# Patient Record
Sex: FEMALE | Race: WHITE | URBAN - METROPOLITAN AREA
[De-identification: names, ages, dates, MRNs, and addresses within clinical notes are randomized per-mention and may not be internally consistent; named-entity substitution may affect disease eponyms.]

---

## 2022-10-17 ENCOUNTER — INITIAL CONSULT (OUTPATIENT)
Dept: ONCOLOGY | Age: 47
End: 2022-10-17
Payer: COMMERCIAL

## 2022-10-17 ENCOUNTER — HOSPITAL ENCOUNTER (OUTPATIENT)
Dept: INFUSION THERAPY | Age: 47
Discharge: HOME | End: 2022-10-17
Payer: COMMERCIAL

## 2022-10-17 VITALS
BODY MASS INDEX: 45.99 KG/M2 | HEIGHT: 67 IN | TEMPERATURE: 97.9 F | OXYGEN SATURATION: 99 % | HEART RATE: 73 BPM | SYSTOLIC BLOOD PRESSURE: 165 MMHG | DIASTOLIC BLOOD PRESSURE: 74 MMHG | WEIGHT: 293 LBS

## 2022-10-17 DIAGNOSIS — D64.9 ANEMIA, UNSPECIFIED TYPE: ICD-10-CM

## 2022-10-17 DIAGNOSIS — D72.819 LEUKOPENIA, UNSPECIFIED TYPE: Primary | ICD-10-CM

## 2022-10-17 DIAGNOSIS — D72.819 LEUKOPENIA, UNSPECIFIED TYPE: ICD-10-CM

## 2022-10-17 LAB
ALBUMIN SERPL-MCNC: 4.2 GM/DL (ref 3.4–5)
ALP BLD-CCNC: 61 IU/L (ref 40–128)
ALT SERPL-CCNC: 8 U/L (ref 10–40)
ANION GAP SERPL CALCULATED.3IONS-SCNC: 11 MMOL/L (ref 4–16)
AST SERPL-CCNC: 11 IU/L (ref 15–37)
BASOPHILS ABSOLUTE: 0 K/CU MM
BASOPHILS RELATIVE PERCENT: 0.5 % (ref 0–1)
BILIRUB SERPL-MCNC: 0.3 MG/DL (ref 0–1)
BUN BLDV-MCNC: 13 MG/DL (ref 6–23)
CALCIUM SERPL-MCNC: 9 MG/DL (ref 8.3–10.6)
CHLORIDE BLD-SCNC: 103 MMOL/L (ref 99–110)
CO2: 23 MMOL/L (ref 21–32)
CREAT SERPL-MCNC: 0.6 MG/DL (ref 0.6–1.1)
DIFFERENTIAL TYPE: ABNORMAL
EOSINOPHILS ABSOLUTE: 0.1 K/CU MM
EOSINOPHILS RELATIVE PERCENT: 1.5 % (ref 0–3)
FERRITIN: 24 NG/ML (ref 15–150)
FOLATE: 7.6 NG/ML (ref 3.1–17.5)
GFR SERPL CREATININE-BSD FRML MDRD: >60 ML/MIN/1.73M2
GLUCOSE BLD-MCNC: 107 MG/DL (ref 70–99)
HCT VFR BLD CALC: 41.1 % (ref 37–47)
HEMOGLOBIN: 12.6 GM/DL (ref 12.5–16)
HEPATITIS C ANTIBODY: NON REACTIVE
IRON: 56 UG/DL (ref 37–145)
LYMPHOCYTES ABSOLUTE: 1.1 K/CU MM
LYMPHOCYTES RELATIVE PERCENT: 27.9 % (ref 24–44)
MCH RBC QN AUTO: 28.1 PG (ref 27–31)
MCHC RBC AUTO-ENTMCNC: 30.7 % (ref 32–36)
MCV RBC AUTO: 91.5 FL (ref 78–100)
MONOCYTES ABSOLUTE: 0.5 K/CU MM
MONOCYTES RELATIVE PERCENT: 11.3 % (ref 0–4)
PCT TRANSFERRIN: 15 % (ref 10–44)
PDW BLD-RTO: 14.7 % (ref 11.7–14.9)
PLATELET # BLD: 227 K/CU MM (ref 140–440)
PMV BLD AUTO: 11.7 FL (ref 7.5–11.1)
POTASSIUM SERPL-SCNC: 4.4 MMOL/L (ref 3.5–5.1)
RBC # BLD: 4.49 M/CU MM (ref 4.2–5.4)
RETICULOCYTE COUNT PCT: 1.9 % (ref 0.2–2.2)
SEGMENTED NEUTROPHILS ABSOLUTE COUNT: 2.4 K/CU MM
SEGMENTED NEUTROPHILS RELATIVE PERCENT: 58.8 % (ref 36–66)
SODIUM BLD-SCNC: 137 MMOL/L (ref 135–145)
TOTAL IRON BINDING CAPACITY: 365 UG/DL (ref 250–450)
TOTAL PROTEIN: 6.7 GM/DL (ref 6.4–8.2)
TSH HIGH SENSITIVITY: 2.14 UIU/ML (ref 0.27–4.2)
UNSATURATED IRON BINDING CAPACITY: 309 UG/DL (ref 110–370)
VITAMIN B-12: 307 PG/ML (ref 211–911)
WBC # BLD: 4.1 K/CU MM (ref 4–10.5)

## 2022-10-17 PROCEDURE — 99204 OFFICE O/P NEW MOD 45 MIN: CPT | Performed by: INTERNAL MEDICINE

## 2022-10-17 PROCEDURE — 82746 ASSAY OF FOLIC ACID SERUM: CPT

## 2022-10-17 PROCEDURE — 82607 VITAMIN B-12: CPT

## 2022-10-17 PROCEDURE — 84443 ASSAY THYROID STIM HORMONE: CPT

## 2022-10-17 PROCEDURE — 86707 HEPATITIS BE ANTIBODY: CPT

## 2022-10-17 PROCEDURE — 85045 AUTOMATED RETICULOCYTE COUNT: CPT

## 2022-10-17 PROCEDURE — 99211 OFF/OP EST MAY X REQ PHY/QHP: CPT

## 2022-10-17 PROCEDURE — 83550 IRON BINDING TEST: CPT

## 2022-10-17 PROCEDURE — 83540 ASSAY OF IRON: CPT

## 2022-10-17 PROCEDURE — 36415 COLL VENOUS BLD VENIPUNCTURE: CPT

## 2022-10-17 PROCEDURE — 85025 COMPLETE CBC W/AUTO DIFF WBC: CPT

## 2022-10-17 PROCEDURE — 86038 ANTINUCLEAR ANTIBODIES: CPT

## 2022-10-17 PROCEDURE — 86803 HEPATITIS C AB TEST: CPT

## 2022-10-17 PROCEDURE — 84165 PROTEIN E-PHORESIS SERUM: CPT

## 2022-10-17 PROCEDURE — 82728 ASSAY OF FERRITIN: CPT

## 2022-10-17 PROCEDURE — 87389 HIV-1 AG W/HIV-1&-2 AB AG IA: CPT

## 2022-10-17 PROCEDURE — 80053 COMPREHEN METABOLIC PANEL: CPT

## 2022-10-17 PROCEDURE — 84436 ASSAY OF TOTAL THYROXINE: CPT

## 2022-10-17 PROCEDURE — 84155 ASSAY OF PROTEIN SERUM: CPT

## 2022-10-17 RX ORDER — ERGOCALCIFEROL (VITAMIN D2) 50 MCG
CAPSULE ORAL
COMMUNITY

## 2022-10-17 RX ORDER — HYDROXYCHLOROQUINE SULFATE 200 MG/1
TABLET, FILM COATED ORAL
COMMUNITY
Start: 2022-09-14

## 2022-10-17 RX ORDER — LEVOTHYROXINE SODIUM 50 MCG
TABLET ORAL
COMMUNITY
Start: 2022-08-12

## 2022-10-17 ASSESSMENT — PATIENT HEALTH QUESTIONNAIRE - PHQ9
SUM OF ALL RESPONSES TO PHQ QUESTIONS 1-9: 0
SUM OF ALL RESPONSES TO PHQ QUESTIONS 1-9: 0
SUM OF ALL RESPONSES TO PHQ9 QUESTIONS 1 & 2: 0
SUM OF ALL RESPONSES TO PHQ QUESTIONS 1-9: 0
2. FEELING DOWN, DEPRESSED OR HOPELESS: 0
SUM OF ALL RESPONSES TO PHQ QUESTIONS 1-9: 0
1. LITTLE INTEREST OR PLEASURE IN DOING THINGS: 0

## 2022-10-17 NOTE — PROGRESS NOTES
hydroxychloroquine (PLAQUENIL) 200 MG tablet       SYNTHROID 50 MCG tablet TAKE 1 TABLET BY MOUTH EVERY DAY      metoprolol tartrate (LOPRESSOR) 25 MG tablet TAKE 1/2 TABLET BY MOUTH TWICE A DAY      Vitamin D, Ergocalciferol, 50 MCG (2000 UT) CAPS Take by mouth       No current facility-administered medications on file prior to visit. Review of Systems:    Constitutional:  No weight loss, No fever, No chills, No night sweats. Energy level fair  Eyes:  No diplopia, No transient or permanent loss of vision, No scotomata. ENT / Mouth:  No epistaxis, No dysphagia, No hoarseness, No oral ulcers, No gingival bleeding. No sore throat, No postnasal drip, No nasal drip, No mouth pain, No sinus pain, No tinnitus, Normal hearing. Cardiovascular:  No chest pain, No palpitations, No syncope, No upper extremity edema, No lower extremity edema, No calf discomfort. Respiratory:  No cough. No hemoptysis, No pleurisy, No wheezing, No dyspnea. Gastrointestinal:  No abdominal pain, No abdominal cramping, No nausea, No vomiting, No constipation, No diarrhea, No hematochezia, No melena, No jaundice, No dyspepsia, No dysphagia. Urinary:  No dysuria, No hematuria, No urinary incontinence.      Vital Signs: BP (!) 165/74 (Site: Right Lower Arm, Position: Sitting, Cuff Size: Medium Adult)   Pulse 73   Temp 97.9 °F (36.6 °C) (Temporal)   Ht 5' 7\" (1.702 m)   Wt (!) 314 lb (142.4 kg)   SpO2 99%   BMI 49.18 kg/m²      CONSTITUTIONAL: awake, alert, obese, anxious   EYES: JANET, No pallor or any icterus  ENT: ATNC  NECK: No JVD  HEMATOLOGIC/LYMPHATIC: no cervical, supraclavicular or axillary lymphadenopathy   LUNGS: CTAB  CARDIOVASCULAR: distant heart sounds   ABDOMEN: soft ntnd bs pos  MUSCULOSKELETAL: full range of motion noted, tone is normal  NEUROLOGIC: GI  SKIN: No rash  EXTREMITIES: Mild LE edema bilaterally     Labs:  Hematology:  No results found for: WBC, RBC, HGB, HCT, MCV, MCH, MCHC, RDW, PLT, MPV, BANDSPCT, SEGSPCT, EOSRELPCT, BASOPCT, LYMPHOPCT, MONOPCT, BANDABS, SEGSABS, EOSABS, BASOSABS, LYMPHSABS, MONOSABS, DIFFTYPE, ANISOCYTOSIS, POLYCHROM, WBCMORP, PLTM  No results found for: ESR  Chemistry:  No results found for: NA, K, CL, CO2, BUN, CREATININE, GLUCOSE, CALCIUM, PROT, LABALBU, BILITOT, ALKPHOS, AST, ALT, LABGLOM, GFRAA, AGRATIO, GLOB, PHOS, MG, POCCA, POCGLU  No results found for: MMA, LDH, HOMOCYSTEINE  No components found for: LD  No results found for: TSHHS, T4FREE, FT3  Immunology:  No results found for: PROT, SPEP, ALBUMINELP, LABALPH, LABBETA, GAMGLOB  No results found for: Oleta Greyson, KLFLCR  No results found for: B2M  Coagulation Panel:  No results found for: PROTIME, INR, APTT, DDIMER  Anemia Panel:  No results found for: MQSGTLTI96, FOLATE  Tumor Markers:  No results found for: , CEA, , LABCA2, PSA     Observations:  ECOG:  PHQ-9 Total Score: 0 (10/17/2022 10:38 AM)       Assessment & Plan:                                                          Leukopenia, could be secondary to antibiotic use in August 2022. But will repeat CBC and if low again will rule out nutritional deficiency, thyroid dysfunction, hepatitis, HIV, connective tissue disorder. But could be secondary to rheumatoid arthritis and/or hydroxychloroquine as well as well. Is followed by rhumatology    Anemia, normocytic. No with history of for menorrhagia. Will rule out iron deficiency. Hypertension, looks like she is on metoprolol and is compliant with that. Recommend that she maintains a log and discuss with primary care physician if remains elevated. Recommend low-salt diet and weight loss. Exercise as tolerated. Continue other medical care. Thank you for letting us be part of the care and will follow along. Discussed above findings and plan with her and she voiced understanding. Answered all her questions. Discussed healthy lifestyle including healthy diet, regular exercise as tolerated. Also discussed importance of being up-to-date with age-appropriate screening tools. Looks like she is up-to-date with mammogram.  Colonoscopy 2022 with benign polyps. Is up-to-date with Pap smears as well    Recommend follow-up with primary care physician and other specialists. Please do not hesitate to contact us if you need further information. Return to clinic 3 months or earlier if new Sx    I have recommended that the patient follow CDC guidelines for prevention of COVID-19 infection.     [id number]al

## 2022-10-17 NOTE — PROGRESS NOTES
MA Rooming Questions  Patient: Earl Lord  MRN: [id number]    Date: 10/17/2022        NEW PATIENT   . Pt was made aware that Dr. Lucila Vega has 1 patient(s) to see before them and no questions or concerns at this time. 5. Did the patient have a depression screening completed today?  Yes    PHQ-9 Total Score: 0 (10/17/2022 10:38 AM)       PHQ-9 Given to (if applicable):               PHQ-9 Score (if applicable):                     [] Positive     []  Negative              Does question #9 need addressed (if applicable)                     [] Yes    []  No               Moshe Burgos CMA

## 2022-10-18 LAB
HEPATITIS BE ANTIBODY: NEGATIVE
T4 TOTAL: 8.5 UG/DL (ref 4.5–11.7)

## 2022-10-19 LAB
ANTI-NUCLEAR ANTIBODY (ANA): NORMAL
HIV SCREEN: NON REACTIVE

## 2022-10-21 LAB
ALBUMIN ELP: 3.6 GM/DL (ref 3.2–5.6)
ALPHA-1-GLOBULIN: 0.2 GM/DL (ref 0.1–0.4)
ALPHA-2-GLOBULIN: 0.6 GM/DL (ref 0.4–1.2)
BETA GLOBULIN: 1 GM/DL (ref 0.5–1.3)
GAMMA GLOBULIN: 1.3 GM/DL (ref 0.5–1.6)
SPEP INTERPRETATION: NORMAL
TOTAL PROTEIN: 6.7 GM/DL (ref 6.4–8.2)

## 2023-01-20 ENCOUNTER — HOSPITAL ENCOUNTER (OUTPATIENT)
Dept: INFUSION THERAPY | Age: 48
Discharge: HOME | End: 2023-01-20
Payer: COMMERCIAL

## 2023-01-20 DIAGNOSIS — D64.9 ANEMIA, UNSPECIFIED TYPE: ICD-10-CM

## 2023-01-20 DIAGNOSIS — D72.819 LEUKOPENIA, UNSPECIFIED TYPE: ICD-10-CM

## 2023-01-20 LAB
ALBUMIN SERPL-MCNC: 4.2 GM/DL (ref 3.4–5)
ALP BLD-CCNC: 63 IU/L (ref 40–129)
ALT SERPL-CCNC: 9 U/L (ref 10–40)
ANION GAP SERPL CALCULATED.3IONS-SCNC: 8 MMOL/L (ref 4–16)
AST SERPL-CCNC: 13 IU/L (ref 15–37)
BASOPHILS ABSOLUTE: 0 K/CU MM
BASOPHILS RELATIVE PERCENT: 0.7 % (ref 0–1)
BILIRUB SERPL-MCNC: 0.3 MG/DL (ref 0–1)
BUN BLDV-MCNC: 17 MG/DL (ref 6–23)
CALCIUM SERPL-MCNC: 8.9 MG/DL (ref 8.3–10.6)
CHLORIDE BLD-SCNC: 105 MMOL/L (ref 99–110)
CO2: 29 MMOL/L (ref 21–32)
CREAT SERPL-MCNC: 0.6 MG/DL (ref 0.6–1.1)
DIFFERENTIAL TYPE: ABNORMAL
EOSINOPHILS ABSOLUTE: 0.1 K/CU MM
EOSINOPHILS RELATIVE PERCENT: 2.9 % (ref 0–3)
FERRITIN: 24 NG/ML (ref 15–150)
GFR SERPL CREATININE-BSD FRML MDRD: >60 ML/MIN/1.73M2
GLUCOSE BLD-MCNC: 109 MG/DL (ref 70–99)
HCT VFR BLD CALC: 39.9 % (ref 37–47)
HEMOGLOBIN: 12.3 GM/DL (ref 12.5–16)
IRON: 45 UG/DL (ref 37–145)
LACTATE DEHYDROGENASE: 163 IU/L (ref 120–246)
LYMPHOCYTES ABSOLUTE: 1.4 K/CU MM
LYMPHOCYTES RELATIVE PERCENT: 30.1 % (ref 24–44)
MCH RBC QN AUTO: 27.8 PG (ref 27–31)
MCHC RBC AUTO-ENTMCNC: 30.8 % (ref 32–36)
MCV RBC AUTO: 90.3 FL (ref 78–100)
MONOCYTES ABSOLUTE: 0.5 K/CU MM
MONOCYTES RELATIVE PERCENT: 10.8 % (ref 0–4)
PCT TRANSFERRIN: 12 % (ref 10–44)
PDW BLD-RTO: 14.9 % (ref 11.7–14.9)
PLATELET # BLD: 219 K/CU MM (ref 140–440)
PMV BLD AUTO: 11 FL (ref 7.5–11.1)
POTASSIUM SERPL-SCNC: 4.5 MMOL/L (ref 3.5–5.1)
RBC # BLD: 4.42 M/CU MM (ref 4.2–5.4)
SEGMENTED NEUTROPHILS ABSOLUTE COUNT: 2.5 K/CU MM
SEGMENTED NEUTROPHILS RELATIVE PERCENT: 55.5 % (ref 36–66)
SODIUM BLD-SCNC: 142 MMOL/L (ref 135–145)
TOTAL IRON BINDING CAPACITY: 364 UG/DL (ref 250–450)
TOTAL PROTEIN: 7 GM/DL (ref 6.4–8.2)
UNSATURATED IRON BINDING CAPACITY: 319 UG/DL (ref 110–370)
WBC # BLD: 4.6 K/CU MM (ref 4–10.5)

## 2023-01-20 PROCEDURE — 82728 ASSAY OF FERRITIN: CPT

## 2023-01-20 PROCEDURE — 85025 COMPLETE CBC W/AUTO DIFF WBC: CPT

## 2023-01-20 PROCEDURE — 80053 COMPREHEN METABOLIC PANEL: CPT

## 2023-01-20 PROCEDURE — 36415 COLL VENOUS BLD VENIPUNCTURE: CPT

## 2023-01-20 PROCEDURE — 83540 ASSAY OF IRON: CPT

## 2023-01-20 PROCEDURE — 83615 LACTATE (LD) (LDH) ENZYME: CPT

## 2023-01-20 PROCEDURE — 83550 IRON BINDING TEST: CPT

## 2023-02-10 ENCOUNTER — HOSPITAL ENCOUNTER (OUTPATIENT)
Dept: INFUSION THERAPY | Age: 48
Discharge: HOME | End: 2023-02-10
Payer: COMMERCIAL

## 2023-02-10 ENCOUNTER — OFFICE VISIT (OUTPATIENT)
Dept: ONCOLOGY | Age: 48
End: 2023-02-10
Payer: COMMERCIAL

## 2023-02-10 VITALS
HEIGHT: 67 IN | HEART RATE: 95 BPM | OXYGEN SATURATION: 98 % | SYSTOLIC BLOOD PRESSURE: 171 MMHG | RESPIRATION RATE: 18 BRPM | TEMPERATURE: 97.8 F | WEIGHT: 293 LBS | DIASTOLIC BLOOD PRESSURE: 86 MMHG | BODY MASS INDEX: 45.99 KG/M2

## 2023-02-10 DIAGNOSIS — D64.9 ANEMIA, UNSPECIFIED TYPE: ICD-10-CM

## 2023-02-10 DIAGNOSIS — D72.819 LEUKOPENIA, UNSPECIFIED TYPE: Primary | ICD-10-CM

## 2023-02-10 PROCEDURE — 99211 OFF/OP EST MAY X REQ PHY/QHP: CPT

## 2023-02-10 PROCEDURE — 99214 OFFICE O/P EST MOD 30 MIN: CPT | Performed by: INTERNAL MEDICINE

## 2023-02-10 RX ORDER — FERROUS SULFATE 325(65) MG
325 TABLET ORAL EVERY OTHER DAY
Qty: 15 TABLET | Refills: 5 | Status: SHIPPED | OUTPATIENT
Start: 2023-02-10 | End: 2023-03-12

## 2023-02-10 NOTE — PROGRESS NOTES
Patient Name:  Sony Cummins  Patient :  1975  Patient MRN:  [id number]     Primary Oncologist: Lavonne Winn MD  Referring Provider: AMARIS Johnson     Date of Service: 2/10/2023      Reason for Consult:  LEUKOPENIA      Chief Complaint:    Chief Complaint   Patient presents with    Follow-up         Encounter Diagnoses   Name Primary? Leukopenia, unspecified type Yes    Anemia, unspecified type         HPI:   10/17/2022: She arrived with her  to the clinic today. Reported that she has history of rheumatoid arthritis and fibromyalgia and generalized pains. Denied any fever, night sweats, lymphadenopathy or any weight loss. No abdominal pain, nausea, vomiting, diarrhea. No  symptoms. Reported menorrhagia secondary to history of fibroids. No other overt bleeding. No rash. 2022 CBC WBC of 4.78 hemoglobin of 11.8 hematocrit 37 MCV 89.2 platelet count of 407 differential within normal limits      2022 colonoscopy with polyps, benign pathology    CT scan of the abdomen pelvis was normal per patient postprocedure    10/17/2021 CBC with WBC of 4.1 hemoglobin of 5.6 hematocrit 41.6 and platelets 949 iron saturation of 15% ferritin of 24 CMP within normal limits A18 071 folic acid 7.6 reticulocyte count 1.9 serum protein electrophoresis within normal limits MINISTERIO negative TSH 2.140 T4 normal hepatitis B negative hepatitis C negative HIV negative    2023 ferritin of 24 CBC with WBC of 4.6 hemoglobin 12.3 hematocrit 39.9 MCV of 90 platelets of 808 CMP within normal limits  iron saturations of 12%     Past Medical History:     Rheumatoid arthritis, fibromyalgia, Hashimoto's, adrenal hyperplasia                                                            Past Surgery History:    LEEP in ,  x1                                                                       Social History:   Lives with her .   No history of smoking tobacco, alcohol abuse or any other illicit drug abuse                                                                                                    Family History:    No history of leukemia, lymphoma or any other blood dyscrasias. No history of any malignancy in the family. Allergies   Allergen Reactions    Nabumetone Rash       Current Outpatient Medications on File Prior to Visit   Medication Sig Dispense Refill    hydroxychloroquine (PLAQUENIL) 200 MG tablet       SYNTHROID 50 MCG tablet TAKE 1 TABLET BY MOUTH EVERY DAY      metoprolol tartrate (LOPRESSOR) 25 MG tablet TAKE 1/2 TABLET BY MOUTH TWICE A DAY      Vitamin D, Ergocalciferol, 50 MCG (2000 UT) CAPS Take by mouth       No current facility-administered medications on file prior to visit. Interval history, 2/10/2023, she arrived with her  to the clinic today reported menorrhagia, has history of fibroids. He is followed by gynecology. No other overt bleeding. No chest pain or abdominal pain. No  symptoms. Intentional weight loss, following with weight watchers. No fever, night sweats or any lymphadenopathy.      Review of Systems:  As per interval history     Vital Signs: BP (!) 171/86 (Site: Right Lower Arm, Position: Sitting, Cuff Size: Medium Adult)   Pulse 95   Temp 97.8 °F (36.6 °C) (Infrared)   Resp 18   Ht 5' 7\" (1.702 m)   Wt (!) 311 lb (141.1 kg)   SpO2 98%   BMI 48.71 kg/m²      CONSTITUTIONAL: awake, alert, obese, anxious   EYES: JANET, No pallor or any icterus  ENT: ATNC  NECK: No JVD  HEMATOLOGIC/LYMPHATIC: no cervical, supraclavicular or axillary lymphadenopathy   LUNGS: CTAB  CARDIOVASCULAR: distant heart sounds   ABDOMEN: soft ntnd bs pos  MUSCULOSKELETAL: full range of motion noted, tone is normal  NEUROLOGIC: GI  SKIN: No rash  EXTREMITIES: Mild LE edema bilaterally     Labs:  Hematology:  Lab Results   Component Value Date    WBC 4.6 01/20/2023    RBC 4.42 01/20/2023    HGB 12.3 (L) 01/20/2023    HCT 39.9 01/20/2023    MCV 90.3 01/20/2023    MCH 27.8 01/20/2023    MCHC 30.8 (L) 01/20/2023    RDW 14.9 01/20/2023     01/20/2023    MPV 11.0 01/20/2023    SEGSPCT 55.5 01/20/2023    EOSRELPCT 2.9 01/20/2023    BASOPCT 0.7 01/20/2023    LYMPHOPCT 30.1 01/20/2023    MONOPCT 10.8 (H) 01/20/2023    SEGSABS 2.5 01/20/2023    EOSABS 0.1 01/20/2023    BASOSABS 0.0 01/20/2023    LYMPHSABS 1.4 01/20/2023    MONOSABS 0.5 01/20/2023    DIFFTYPE AUTOMATED DIFFERENTIAL 01/20/2023     No results found for: ESR  Chemistry:  Lab Results   Component Value Date     01/20/2023    K 4.5 01/20/2023     01/20/2023    CO2 29 01/20/2023    BUN 17 01/20/2023    CREATININE 0.6 01/20/2023    GLUCOSE 109 (H) 01/20/2023    CALCIUM 8.9 01/20/2023    PROT 7.0 01/20/2023    LABALBU 4.2 01/20/2023    BILITOT 0.3 01/20/2023    ALKPHOS 63 01/20/2023    AST 13 (L) 01/20/2023    ALT 9 (L) 01/20/2023    LABGLOM >60 01/20/2023     Lab Results   Component Value Date     01/20/2023     No components found for: LD  Lab Results   Component Value Date    TSHHS 2.140 10/17/2022     Immunology:  Lab Results   Component Value Date    PROT 7.0 01/20/2023    SPEP INTERPRETATION - Within normal limits. RS 10/17/2022    ALBUMINELP 3.6 10/17/2022    LABALPH 0.2 10/17/2022    LABALPH 0.6 10/17/2022    LABBETA 1.0 10/17/2022    GAMGLOB 1.3 10/17/2022     No results found for: Bria Miguel, KLFLCR  No results found for: B2M  Coagulation Panel:  No results found for: PROTIME, INR, APTT, DDIMER  Anemia Panel:  Lab Results   Component Value Date    AKGLTMXM28 307.0 10/17/2022    FOLATE 7.6 10/17/2022     Tumor Markers:  No results found for: , CEA, , LABCA2, PSA     Observations:  ECOG:  No data recorded       Assessment & Plan:                                                          Leukopenia, could be secondary to antibiotic use in August 2022.   Repeat labs without any leukopenia and no evidence of any rheumatoid arthritis, lupus, monoclonal gammopathy, hepatitis or HIV. Repeat CBC in January 2023 normal.  No further investigations. Anemia, normocytic. Note ferritin in the low normal range most probably secondary to menorrhagia. Recommend oral iron every other day. Adequate bowel regimen. Follow-up with gynecology. Note she is on Plaquenil for rheumatoid arthritis. Hypertension, looks like she is on metoprolol and is compliant with that. Recommend that she maintains a log and discuss with primary care physician if remains elevated. Recommend low-salt diet and weight loss. Exercise as tolerated. Continue other medical care. Thank you for letting us be part of the care and will follow along. Discussed above findings and plan with her and she voiced understanding. Answered all her questions. Discussed healthy lifestyle including healthy diet, regular exercise as tolerated. Also discussed importance of being up-to-date with age-appropriate screening tools. Looks like she is up-to-date with mammogram, December 2022 normal per her. Colonoscopy 2022 with benign polyps. Is up-to-date with Pap smears as well    Recommend follow-up with primary care physician and other specialists. Please do not hesitate to contact us if you need further information.     Return to to clinic in 6 months or earlier if new Sx    EMELINA

## 2023-02-10 NOTE — PROGRESS NOTES
MA Rooming Questions  Patient: Sherry Appiah  MRN: [id number]    Date: 2/10/2023        1. Do you have any new issues?   no         2. Do you need any refills on medications?    no    3. Have you had any imaging done since your last visit?   no    4. Have you been hospitalized or seen in the emergency room since your last visit here?   no    5. Did the patient have a depression screening completed today?  No    No data recorded     PHQ-9 Given to (if applicable):               PHQ-9 Score (if applicable):                     [] Positive     []  Negative              Does question #9 need addressed (if applicable)                     [] Yes    []  No               Haresh Dunbar MA

## 2023-03-06 RX ORDER — FERROUS SULFATE 325(65) MG
325 TABLET ORAL EVERY OTHER DAY
Qty: 15 TABLET | Refills: 5 | Status: SHIPPED | OUTPATIENT
Start: 2023-03-06 | End: 2023-04-05

## 2023-05-24 ENCOUNTER — HOSPITAL ENCOUNTER (OUTPATIENT)
Dept: INFUSION THERAPY | Age: 48
Discharge: HOME | End: 2023-05-24
Payer: COMMERCIAL

## 2023-05-24 DIAGNOSIS — D72.819 LEUKOPENIA, UNSPECIFIED TYPE: ICD-10-CM

## 2023-05-24 DIAGNOSIS — D64.9 ANEMIA, UNSPECIFIED TYPE: ICD-10-CM

## 2023-05-24 LAB
ALBUMIN SERPL-MCNC: 4.4 GM/DL (ref 3.4–5)
ALP BLD-CCNC: 59 IU/L (ref 40–129)
ALT SERPL-CCNC: 9 U/L (ref 10–40)
ANION GAP SERPL CALCULATED.3IONS-SCNC: 12 MMOL/L (ref 4–16)
AST SERPL-CCNC: 16 IU/L (ref 15–37)
BASOPHILS ABSOLUTE: 0 K/CU MM
BASOPHILS RELATIVE PERCENT: 0.7 % (ref 0–1)
BILIRUB SERPL-MCNC: 0.4 MG/DL (ref 0–1)
BUN SERPL-MCNC: 18 MG/DL (ref 6–23)
CALCIUM SERPL-MCNC: 8.8 MG/DL (ref 8.3–10.6)
CHLORIDE BLD-SCNC: 102 MMOL/L (ref 99–110)
CO2: 23 MMOL/L (ref 21–32)
CREAT SERPL-MCNC: 0.8 MG/DL (ref 0.6–1.1)
DIFFERENTIAL TYPE: ABNORMAL
EOSINOPHILS ABSOLUTE: 0.1 K/CU MM
EOSINOPHILS RELATIVE PERCENT: 1.7 % (ref 0–3)
FERRITIN: 51 NG/ML (ref 15–150)
GFR SERPL CREATININE-BSD FRML MDRD: >60 ML/MIN/1.73M2
GLUCOSE SERPL-MCNC: 101 MG/DL (ref 70–99)
HCT VFR BLD CALC: 41.5 % (ref 37–47)
HEMOGLOBIN: 13.1 GM/DL (ref 12.5–16)
IRON: 61 UG/DL (ref 37–145)
LYMPHOCYTES ABSOLUTE: 1.2 K/CU MM
LYMPHOCYTES RELATIVE PERCENT: 26.7 % (ref 24–44)
MCH RBC QN AUTO: 28.9 PG (ref 27–31)
MCHC RBC AUTO-ENTMCNC: 31.6 % (ref 32–36)
MCV RBC AUTO: 91.4 FL (ref 78–100)
MONOCYTES ABSOLUTE: 0.7 K/CU MM
MONOCYTES RELATIVE PERCENT: 14.6 % (ref 0–4)
PCT TRANSFERRIN: 17 % (ref 10–44)
PDW BLD-RTO: 14.8 % (ref 11.7–14.9)
PLATELET # BLD: 220 K/CU MM (ref 140–440)
PMV BLD AUTO: 11.4 FL (ref 7.5–11.1)
POTASSIUM SERPL-SCNC: 4.5 MMOL/L (ref 3.5–5.1)
RBC # BLD: 4.54 M/CU MM (ref 4.2–5.4)
SEGMENTED NEUTROPHILS ABSOLUTE COUNT: 2.6 K/CU MM
SEGMENTED NEUTROPHILS RELATIVE PERCENT: 56.3 % (ref 36–66)
SODIUM BLD-SCNC: 137 MMOL/L (ref 135–145)
TOTAL IRON BINDING CAPACITY: 363 UG/DL (ref 250–450)
TOTAL PROTEIN: 7.2 GM/DL (ref 6.4–8.2)
UNSATURATED IRON BINDING CAPACITY: 302 UG/DL (ref 110–370)
WBC # BLD: 4.6 K/CU MM (ref 4–10.5)

## 2023-05-24 PROCEDURE — 85025 COMPLETE CBC W/AUTO DIFF WBC: CPT

## 2023-05-24 PROCEDURE — 36415 COLL VENOUS BLD VENIPUNCTURE: CPT

## 2023-05-24 PROCEDURE — 80053 COMPREHEN METABOLIC PANEL: CPT

## 2023-05-24 PROCEDURE — 83540 ASSAY OF IRON: CPT

## 2023-05-24 PROCEDURE — 83550 IRON BINDING TEST: CPT

## 2023-05-24 PROCEDURE — 82728 ASSAY OF FERRITIN: CPT

## 2023-08-23 ENCOUNTER — HOSPITAL ENCOUNTER (OUTPATIENT)
Dept: INFUSION THERAPY | Age: 48
Discharge: HOME | End: 2023-08-23
Payer: COMMERCIAL

## 2023-08-23 DIAGNOSIS — D64.9 ANEMIA, UNSPECIFIED TYPE: ICD-10-CM

## 2023-08-23 DIAGNOSIS — D72.819 LEUKOPENIA, UNSPECIFIED TYPE: ICD-10-CM

## 2023-08-23 LAB
ALBUMIN SERPL-MCNC: 4.6 GM/DL (ref 3.4–5)
ALP BLD-CCNC: 75 IU/L (ref 40–129)
ALT SERPL-CCNC: 9 U/L (ref 10–40)
ANION GAP SERPL CALCULATED.3IONS-SCNC: 9 MMOL/L (ref 4–16)
AST SERPL-CCNC: 12 IU/L (ref 15–37)
BASOPHILS ABSOLUTE: 0 K/CU MM
BASOPHILS RELATIVE PERCENT: 0.9 % (ref 0–1)
BILIRUB SERPL-MCNC: 0.4 MG/DL (ref 0–1)
BUN SERPL-MCNC: 21 MG/DL (ref 6–23)
CALCIUM SERPL-MCNC: 9.1 MG/DL (ref 8.3–10.6)
CHLORIDE BLD-SCNC: 101 MMOL/L (ref 99–110)
CO2: 28 MMOL/L (ref 21–32)
CREAT SERPL-MCNC: 0.8 MG/DL (ref 0.6–1.1)
DIFFERENTIAL TYPE: ABNORMAL
EOSINOPHILS ABSOLUTE: 0.1 K/CU MM
EOSINOPHILS RELATIVE PERCENT: 3.2 % (ref 0–3)
FERRITIN: 58 NG/ML (ref 15–150)
FOLATE SERPL-MCNC: 11.7 NG/ML (ref 3.1–17.5)
GFR SERPL CREATININE-BSD FRML MDRD: >60 ML/MIN/1.73M2
GLUCOSE SERPL-MCNC: 99 MG/DL (ref 70–99)
HCT VFR BLD CALC: 39.8 % (ref 37–47)
HEMOGLOBIN: 12.6 GM/DL (ref 12.5–16)
IRON: 63 UG/DL (ref 37–145)
LYMPHOCYTES ABSOLUTE: 1.2 K/CU MM
LYMPHOCYTES RELATIVE PERCENT: 28.1 % (ref 24–44)
MCH RBC QN AUTO: 28.8 PG (ref 27–31)
MCHC RBC AUTO-ENTMCNC: 31.7 % (ref 32–36)
MCV RBC AUTO: 90.9 FL (ref 78–100)
MONOCYTES ABSOLUTE: 0.5 K/CU MM
MONOCYTES RELATIVE PERCENT: 11.1 % (ref 0–4)
PCT TRANSFERRIN: 18 % (ref 10–44)
PDW BLD-RTO: 13.8 % (ref 11.7–14.9)
PLATELET # BLD: 190 K/CU MM (ref 140–440)
PMV BLD AUTO: 11.8 FL (ref 7.5–11.1)
POTASSIUM SERPL-SCNC: 4.3 MMOL/L (ref 3.5–5.1)
RBC # BLD: 4.38 M/CU MM (ref 4.2–5.4)
SEGMENTED NEUTROPHILS ABSOLUTE COUNT: 2.5 K/CU MM
SEGMENTED NEUTROPHILS RELATIVE PERCENT: 56.7 % (ref 36–66)
SODIUM BLD-SCNC: 138 MMOL/L (ref 135–145)
TOTAL IRON BINDING CAPACITY: 349 UG/DL (ref 250–450)
TOTAL PROTEIN: 7.1 GM/DL (ref 6.4–8.2)
UNSATURATED IRON BINDING CAPACITY: 286 UG/DL (ref 110–370)
VITAMIN B-12: 329.1 PG/ML (ref 211–911)
WBC # BLD: 4.4 K/CU MM (ref 4–10.5)

## 2023-08-23 PROCEDURE — 83540 ASSAY OF IRON: CPT

## 2023-08-23 PROCEDURE — 80053 COMPREHEN METABOLIC PANEL: CPT

## 2023-08-23 PROCEDURE — 36415 COLL VENOUS BLD VENIPUNCTURE: CPT

## 2023-08-23 PROCEDURE — 82728 ASSAY OF FERRITIN: CPT

## 2023-08-23 PROCEDURE — 82607 VITAMIN B-12: CPT

## 2023-08-23 PROCEDURE — 82746 ASSAY OF FOLIC ACID SERUM: CPT

## 2023-08-23 PROCEDURE — 85025 COMPLETE CBC W/AUTO DIFF WBC: CPT

## 2023-08-23 PROCEDURE — 83550 IRON BINDING TEST: CPT

## 2023-08-30 ENCOUNTER — OFFICE VISIT (OUTPATIENT)
Dept: ONCOLOGY | Age: 48
End: 2023-08-30
Payer: COMMERCIAL

## 2023-08-30 ENCOUNTER — HOSPITAL ENCOUNTER (OUTPATIENT)
Dept: INFUSION THERAPY | Age: 48
Discharge: HOME | End: 2023-08-30
Payer: COMMERCIAL

## 2023-08-30 VITALS
TEMPERATURE: 97.6 F | DIASTOLIC BLOOD PRESSURE: 81 MMHG | BODY MASS INDEX: 45.99 KG/M2 | WEIGHT: 293 LBS | HEART RATE: 75 BPM | OXYGEN SATURATION: 100 % | SYSTOLIC BLOOD PRESSURE: 156 MMHG | HEIGHT: 67 IN

## 2023-08-30 DIAGNOSIS — D64.9 ANEMIA, UNSPECIFIED TYPE: ICD-10-CM

## 2023-08-30 DIAGNOSIS — D72.819 LEUKOPENIA, UNSPECIFIED TYPE: Primary | ICD-10-CM

## 2023-08-30 PROCEDURE — 99213 OFFICE O/P EST LOW 20 MIN: CPT | Performed by: INTERNAL MEDICINE

## 2023-08-30 PROCEDURE — 99211 OFF/OP EST MAY X REQ PHY/QHP: CPT

## 2023-08-30 ASSESSMENT — PATIENT HEALTH QUESTIONNAIRE - PHQ9
SUM OF ALL RESPONSES TO PHQ QUESTIONS 1-9: 0
SUM OF ALL RESPONSES TO PHQ9 QUESTIONS 1 & 2: 0
SUM OF ALL RESPONSES TO PHQ QUESTIONS 1-9: 0
2. FEELING DOWN, DEPRESSED OR HOPELESS: 0
1. LITTLE INTEREST OR PLEASURE IN DOING THINGS: 0
SUM OF ALL RESPONSES TO PHQ QUESTIONS 1-9: 0
SUM OF ALL RESPONSES TO PHQ QUESTIONS 1-9: 0